# Patient Record
Sex: FEMALE | URBAN - METROPOLITAN AREA
[De-identification: names, ages, dates, MRNs, and addresses within clinical notes are randomized per-mention and may not be internally consistent; named-entity substitution may affect disease eponyms.]

---
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
## 2025-01-06 ENCOUNTER — APPOINTMENT (OUTPATIENT)
Dept: PLASTIC SURGERY | Facility: CLINIC | Age: 20
End: 2025-01-06
Payer: COMMERCIAL

## (undated) DEVICE — SUTURE, PDS II, 2-0, 27 IN, SH, VIOLET

## (undated) DEVICE — SPONGE, LAP, XRAY DECT, SC+RFID, 18X18, NO LOOP, STERILE

## (undated) DEVICE — DRESSING, NON-ADHERENT, TELFA, 2 X 3 IN, STERILE

## (undated) DEVICE — BLADE, PLASMA, PEAK, 3.0S LIGHT

## (undated) DEVICE — APPLICATOR, CHLORAPREP, W/ORANGE TINT, 26ML

## (undated) DEVICE — EVACUATOR, WOUND, SUCTION, CLOSED, JACKSON-PRATT, 100 CC, SILICONE

## (undated) DEVICE — GOWN, ASTOUND, L

## (undated) DEVICE — BANDAGE, ELASTIC, REINFORCED, ECONO-WRAP, 4 IN X 4.5 YD, LATEX

## (undated) DEVICE — MARKER, SKIN, RULER AND LABEL PACK, CUSTOM

## (undated) DEVICE — DRESSING, PREVENA PLUS, CUSTOMIZABLE, 90CM

## (undated) DEVICE — TISSUE ADHESIVE, EXOFIN, 1ML

## (undated) DEVICE — SUCTION TIP , YANKAUER, W/BULB SUCTION

## (undated) DEVICE — Device

## (undated) DEVICE — TOWEL, SURGICAL, NEURO, O/R, 16 X 26, BLUE, STERILE

## (undated) DEVICE — SUTURE, PDS II, 5-0, 18 IN, P3, CLEAR

## (undated) DEVICE — THERAPY UNIT, PREVENA PLUS 125

## (undated) DEVICE — SUTURE, MONOCRYL, 4-0, 18 IN, PS2, UNDYED

## (undated) DEVICE — SYRINGE, 10 CC, LUER LOCK

## (undated) DEVICE — 13FT VITRUVIAN SOFTOUCH PUMP TUBING

## (undated) DEVICE — SUTURE, PLAIN GUT, 4-0, RB-1, 27"

## (undated) DEVICE — SUTURE, CHROMIC, 4-0, 18 IN, PS2, BROWN

## (undated) DEVICE — GLOVE, SURGICAL, PROTEXIS PI , 7.5, PF, LF

## (undated) DEVICE — CLOSURE SYSTEM, DERMABOND, PRINEO, 22CM, STERILE

## (undated) DEVICE — COVER, PLASTIC, MAYO STAND, 29.5IN X 55.5IN

## (undated) DEVICE — DRESSING, GAUZE, SPONGE, KERLIX, SUPER, 6 X 6.75 IN, STERILE 10PK

## (undated) DEVICE — PACK, BASIC

## (undated) DEVICE — MARKER, SKIN, REGULAR TIP, W/W/FLEXI RULER, LABEL

## (undated) DEVICE — NEEDLE, FILTER 19 G X 1 IN

## (undated) DEVICE — DRESSING, ABDOMINAL PAD, CURITY, 8 X 10 IN

## (undated) DEVICE — APPLIER, CLIP, PREMIUM, W/SUPER INTERLOCK CLIPS, SMALL, TITANIUM, 9 MM

## (undated) DEVICE — SUTURE, SILK, 0, 30 IN, CT-1, BLACK

## (undated) DEVICE — SYRINGE, 60 CC, IRRIGATION, BULB, CONTRO-BULB, PAPER POUCH

## (undated) DEVICE — BANDAGE, ELASTIC, REINFORCED, ECONO-WRAP, 6 IN X 4.5 YD, LATEX

## (undated) DEVICE — SYRINGE, 20 CC, LUER LOCK

## (undated) DEVICE — STAPLER, SKIN, PLUS, REGULAR, 35

## (undated) DEVICE — SYRINGE, 3 CC, LUER LOCK, W/NEEDLE, 23 G X 1 IN

## (undated) DEVICE — SUTURE, VICRYL, 3-0, 27 IN, SH, VIOLET

## (undated) DEVICE — GLOVE, SURGICAL, PROTEGRITY, NEU-THERA, 8.0, PF, LATEX